# Patient Record
Sex: MALE | Race: WHITE
[De-identification: names, ages, dates, MRNs, and addresses within clinical notes are randomized per-mention and may not be internally consistent; named-entity substitution may affect disease eponyms.]

---

## 2019-04-16 ENCOUNTER — HOSPITAL ENCOUNTER (EMERGENCY)
Dept: HOSPITAL 10 - FTE | Age: 29
Discharge: HOME | End: 2019-04-16
Payer: COMMERCIAL

## 2019-04-16 ENCOUNTER — HOSPITAL ENCOUNTER (EMERGENCY)
Dept: HOSPITAL 91 - FTE | Age: 29
Discharge: HOME | End: 2019-04-16
Payer: COMMERCIAL

## 2019-04-16 VITALS — SYSTOLIC BLOOD PRESSURE: 124 MMHG | RESPIRATION RATE: 18 BRPM | HEART RATE: 92 BPM | DIASTOLIC BLOOD PRESSURE: 71 MMHG

## 2019-04-16 VITALS
BODY MASS INDEX: 22.81 KG/M2 | BODY MASS INDEX: 22.81 KG/M2 | HEIGHT: 64 IN | WEIGHT: 133.6 LBS | HEIGHT: 64 IN | WEIGHT: 133.6 LBS
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
## 2019-04-16 DIAGNOSIS — S93.401A: Primary | ICD-10-CM

## 2019-04-16 PROCEDURE — 99283 EMERGENCY DEPT VISIT LOW MDM: CPT

## 2019-04-16 PROCEDURE — 73610 X-RAY EXAM OF ANKLE: CPT

## 2019-04-16 PROCEDURE — 29515 APPLICATION SHORT LEG SPLINT: CPT

## 2019-04-16 RX ADMIN — ACETAMINOPHEN 1 MG: 325 TABLET, FILM COATED ORAL at 21:05

## 2019-04-16 RX ADMIN — IBUPROFEN 1 MG: 200 TABLET, FILM COATED ORAL at 21:05

## 2019-04-16 NOTE — ERD
ER Documentation


Chief Complaint


Chief Complaint





R ANKLE PAIN S/P FALL YESTERDAY





HPI


29-year-old male, presents to the emergency department, complaining of right 


ankle pain after a forced inversion that occurred yesterday.  The pain is dull, 


constant, worsened by ambulation, 5/10.  The patient denies distal weakness, 


numbness or tingling.





ROS


All systems reviewed and are negative except as per history of present illness.





Allergies


Allergies:  


Coded Allergies:  


     No Known Allergy (Unverified , 4/16/19)





PMhx/Soc


Medical and Surgical Hx:  pt denies Medical Hx, pt denies Surgical Hx


Hx Alcohol Use:  No


Hx Substance Use:  No


Hx Tobacco Use:  No


Smoking Status:  Never smoker





Physical Exam


Vitals





Vital Signs


  Date      Temp  Pulse  Resp  B/P (MAP)   Pulse Ox  O2          O2 Flow    FiO2


Time                                                 Delivery    Rate


   4/16/19  97.5     92    18      124/71        97


     20:13                           (88)





Physical Exam


Const:   No acute distress


Head:   Atraumatic 


Eyes:    Normal Conjunctiva


ENT:    Normal External Ears, Nose and Mouth.


Neck:               Full range of motion. No meningismus.


Resp:   Clear to auscultation bilaterally


Cardio:   Regular rate and rhythm, no murmurs


Abd:    Soft, non tender, non distended. Normal bowel sounds


Skin:   No petechiae or rashes


Back:   No midline or flank tenderness


Ext:    Right ankle with lateral malleolar edema and tenderness to palpation, 


decreased range of motion due to pain, distal neurovascular exam intact.


Neur:   Awake and alert


Psych:    Normal Mood and Affect


Results 24 hrs





Current Medications


 Medications
   Dose
          Sig/Silvio
       Start Time
   Status  Last


 (Trade)       Ordered        Route
 PRN     Stop Time              Admin
Dose


                              Reason                                Admin


                650 mg         ONCE  ONCE
    4/16/19       DC           4/16/19


Acetaminophen                 PO
            21:00
                       21:05




  (Tylenol                                  4/16/19 21:01


Tab)


 Ibuprofen
     400 mg         ONCE  ONCE
    4/16/19       DC           4/16/19


(Motrin)                      PO
            21:00
                       21:05



                                             4/16/19 21:01








Procedures/MDM


acute right ankle pain: no red flags. Differential diagnosis include but not 


limited to: Ankle sprain/strain, ligament injury, arthritis; low suspicion for 


fracture, dislocation, septic arthritis. Neurovascular exam grossly intact. no 


clinical findings suggestive of acute infectious process, no deformity, no 


rashes.


Pertinent Data: 


X-rays: No fracture or dislocation


Physical examination and clinical presentation consistent most likely with right


ankle sprain.


During the ED course the patient received treatment with short leg posterior 


splint and crutches presenting overall improvement of the symptoms.


Splint evaluation:


Type: Posterior ankle


Location: Right


Position: good alignment in anatomical position


Neurovascular intact





The patient was told that elevating the injured part will help reduce pain and 


swelling.


Ice packs can decrease pain and promote healing when applied in the first two 


days after an injury.  The pack should be dry on the outside.  Apply it for half


an hour three to four times a day.





Results and clinical impression discussed with patient who agrees with 


management. The patient is stable to be treated outpatient and will be 


discharged home with recommendations for ice, rest and partial immobilization.  


NSAIDs 3 times daily for 5 days and close monitoring.





The patient was instructed to follow up with the primary care provider in the 


next 48h.  If symptoms persist, worsen or new symptoms develop, then patient 


should return to the ED immediately.





Instructions explained and given to patient with acknowledgment and demonstrated


understanding.





Disclaimer: Inadvertent spelling and grammatical errors are likely due to EHR


/dictation software use and do not reflect on the overall quality of patient 


care. Also, please note that the electronic time recorded on this note does not 


necessarily reflect the actual time of the patient encounter.





Departure


Diagnosis:  


   Primary Impression:  


   Right ankle sprain


Condition:  Stable


Patient Instructions:  Treating Ankle Sprains





Additional Instructions:  


Thank you very much for allowing us to participate in your care. 


Your health and safety is our top priority at [facility].





Call your primary care doctor TOMORROW for an appointment during the next 2-4 


days and bring all the information and medications prescribed. 





Have prescriptions filled and follow precisely the directions on the label. 





If the symptoms get worse and your provider is unavailable, return to the 


Emergency Department immediately.











GAMALIEL BATRES MD      Apr 16, 2019 20:45